# Patient Record
(demographics unavailable — no encounter records)

---

## 2025-02-18 NOTE — ASSESSMENT
[FreeTextEntry1] : HLD -c/w statin -check cmp and lipid panel   CKD3 -c/w farxiga and ozempic -ACEi stopped 2/2 hyperK -follows nephrology Dr. Teddy GARCIA shoulder pain -likely msk -discussed conservative measures including stretching, good posture, tylenol/nsaid prn, warm/cool packs, otc lidocaine patches prn -PT ordered -if refractory, will send to ortho  BPH -no longer on flomax -check psa -follows with urology Dr. Kraft   RTC for CPE 6/2025 or sooner prn

## 2025-02-18 NOTE — PHYSICAL EXAM
[No Acute Distress] : no acute distress [Well-Appearing] : well-appearing [Normal Sclera/Conjunctiva] : normal sclera/conjunctiva [EOMI] : extraocular movements intact [No Lymphadenopathy] : no lymphadenopathy [Supple] : supple [Thyroid Normal, No Nodules] : the thyroid was normal and there were no nodules present [No Respiratory Distress] : no respiratory distress  [No Accessory Muscle Use] : no accessory muscle use [Clear to Auscultation] : lungs were clear to auscultation bilaterally [Normal Rate] : normal rate  [Regular Rhythm] : with a regular rhythm [Normal S1, S2] : normal S1 and S2 [No Edema] : there was no peripheral edema [No Extremity Clubbing/Cyanosis] : no extremity clubbing/cyanosis [Soft] : abdomen soft [Non Tender] : non-tender [Non-distended] : non-distended [Normal Bowel Sounds] : normal bowel sounds [Normal Affect] : the affect was normal [Normal Insight/Judgement] : insight and judgment were intact [de-identified] : R shoulder ROM limited

## 2025-02-18 NOTE — HISTORY OF PRESENT ILLNESS
[FreeTextEntry1] : follow up [de-identified] : OSIRIS RAPHAEL is a 63 year M who presents for follow up PMHx CKD3, BPH, HLD, GERD, obesity  1. Ramipril was stopped by cardio for hyperK 2 months ago 2. Started on ozempic by his nephrologist, first dose last week. Will be following with nephro next month. 3. Has intermittent R shoulder pain for 2-3 months. Worse with use, resolves with rest. Denies inciting trauma or injury. Denies arm numbness/tingling/weakness.  Otherwise, feels well   Urology Dr. Kraft Nephrology Dr. Teddy Michael Cardio Dr. Garcia

## 2025-03-21 NOTE — ASSESSMENT
[FreeTextEntry1] : bothered by LUts and ED - trial of daily tadalafil reviewed surgery for spermatoceles - not bothered enough - observe

## 2025-03-21 NOTE — HISTORY OF PRESENT ILLNESS
[FreeTextEntry1] : patient referred for evalaution of some urinary issues and proteinuria He notes first am void of day hard to start and slow; may need more than one void to empty. Remainder day Ok. has occasional urgency but no frequency or incontinence. Has nocturia 0-1 but no dysuria, hematuria or h/o UTI or retention. Also noted to have proteinuria on dipstick - 300mg. No foamy urine . PVR 30cc.   11/21 referred by nephrology; notes that his hesitancy and need to push happened more frequently and can happen during the day and not just in the morning as before. No increase frequency or urgency or dysuria or hematuria.  voided 77 cc peak FR 9.6cc with PVR 30   4/24 - has been on alfuzosin since last visit. not working as well now: has hesitancy, slower flow, intermittency and pushes to empty. Has nocturia 2-3 times - no urgency. occasional dysuria bit no blood. Notes the right testis is bigger. - no pain.  ULS last year -  33cc prostate     7/24 tamsulosin FOS better and pushes less; nocturia 1-2 from 2-3.  ULS noted he has bilateral spermatoceles - he has no pain   3/25 stopped the tamsulosin still pushes to wmpty - if doesn't feels not empty. FOS OK; nocturia 102 and no urgency no dysuria or hematuria.  feels the spermatoceles bigger espcially the right - noo pain, just heaviness and can interfere with sex though has some degree of ED  Voide 102 66 peak FR 10cc and

## 2025-03-21 NOTE — PHYSICAL EXAM
[General Appearance - Well Developed] : well developed [General Appearance - Well Nourished] : well nourished [Edema] : no peripheral edema [Exaggerated Use Of Accessory Muscles For Inspiration] : no accessory muscle use [Abdomen Soft] : soft [Abdomen Tenderness] : non-tender [Abdomen Mass (___ Cm)] : no abdominal mass palpated [Abdomen Hernia] : no hernia was discovered [Urethral Meatus] : meatus normal [Penis Abnormality] : normal circumcised penis [Scrotum] : the scrotum was normal [Testes Tenderness] : no tenderness of the testes [Testes Mass (___cm)] : there were no testicular masses [Normal Station and Gait] : the gait and station were normal for the patient's age [No Focal Deficits] : no focal deficits [] : no rash [de-identified] : bilateral  spermatoceels- r>L - no tenderness

## 2025-03-21 NOTE — PHYSICAL EXAM
[General Appearance - Well Developed] : well developed [General Appearance - Well Nourished] : well nourished [Edema] : no peripheral edema [Exaggerated Use Of Accessory Muscles For Inspiration] : no accessory muscle use [Abdomen Soft] : soft [Abdomen Tenderness] : non-tender [Abdomen Mass (___ Cm)] : no abdominal mass palpated [Urethral Meatus] : meatus normal [Abdomen Hernia] : no hernia was discovered [Penis Abnormality] : normal circumcised penis [Scrotum] : the scrotum was normal [Testes Tenderness] : no tenderness of the testes [Testes Mass (___cm)] : there were no testicular masses [Normal Station and Gait] : the gait and station were normal for the patient's age [No Focal Deficits] : no focal deficits [] : no rash [de-identified] : bilateral  spermatoceels- r>L - no tenderness

## 2025-07-02 NOTE — ASSESSMENT
[Vaccines Reviewed] : Immunizations reviewed today. Please see immunization details in the vaccine log within the immunization flowsheet.  [FreeTextEntry1] : Health Care Maintenance - well visit - routine labs ordered - depression screen negative - ekg- follows with cardio  - colonoscopy 2024 GI Dr. Myke Christianson, repeat due in 3 years  - flu vaccine- recommend annually - covid vaccines s/p 2 doses - tdap <10 years - shingles vaccine- recommend - pneumonia- reports prevnar 2025 with nephro  - advised to get annual eye exams with ophthalmology, skin exams with dermatology, and dental exams  HLD -c/w statin -check cmp and lipid panel  CKD3 -c/w farxiga and ramipril with concurrent lokelma QD for hx of hyperK -follows nephrology Dr. Teddy GARCIA shoulder pain -resolved   BPH Spermatocele  -follows with urology Dr. Kraft  RTC in 3-6 months

## 2025-07-02 NOTE — HISTORY OF PRESENT ILLNESS
[FreeTextEntry1] : cpe [de-identified] : OSIRIS RAPHAEL is a 63 year M who presents for CPE PMHx CKD3, BPH, HLD, GERD, obesity  Feels well overall Ramipril restarted by nephro, with lokelma 5mg QD given hx of hyperkalemia on ACEi Ozempic stopped due to vomiting.   Urology Dr. Kraft Nephrology Dr. Teddy Michael Cardio Dr. Garcia

## 2025-07-02 NOTE — HEALTH RISK ASSESSMENT
[Yes] : Yes [Monthly or less (1 pt)] : Monthly or less (1 point) [1 or 2 (0 pts)] : 1 or 2 (0 points) [Never (0 pts)] : Never (0 points) [No] : In the past 12 months have you used drugs other than those required for medical reasons? No [0] : 2) Feeling down, depressed, or hopeless: Not at all (0) [PHQ-2 Negative - No further assessment needed] : PHQ-2 Negative - No further assessment needed [Former] : Former [0-4] : 0-4 [> 15 Years] : > 15 Years [Employed] : employed [] :  [# Of Children ___] : has [unfilled] children [Feels Safe at Home] : Feels safe at home [Fully functional (bathing, dressing, toileting, transferring, walking, feeding)] : Fully functional (bathing, dressing, toileting, transferring, walking, feeding) [Fully functional (using the telephone, shopping, preparing meals, housekeeping, doing laundry, using] : Fully functional and needs no help or supervision to perform IADLs (using the telephone, shopping, preparing meals, housekeeping, doing laundry, using transportation, managing medications and managing finances) [Audit-CScore] : 1 [[id number]] : 0 [de-identified] : quit in high school  [ColonoscopyDate] : 2024 [ColonoscopyComments] : GI Dr. 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